# Patient Record
Sex: MALE | Race: WHITE | NOT HISPANIC OR LATINO | URBAN - METROPOLITAN AREA
[De-identification: names, ages, dates, MRNs, and addresses within clinical notes are randomized per-mention and may not be internally consistent; named-entity substitution may affect disease eponyms.]

---

## 2018-04-05 ENCOUNTER — TELEPHONE (OUTPATIENT)
Dept: OTOLARYNGOLOGY | Facility: CLINIC | Age: 63
End: 2018-04-05

## 2018-04-06 ENCOUNTER — TELEPHONE (OUTPATIENT)
Dept: OTOLARYNGOLOGY | Facility: CLINIC | Age: 63
End: 2018-04-06

## 2018-04-06 NOTE — TELEPHONE ENCOUNTER
----- Message from Kane Thompson sent at 4/6/2018  9:12 AM CDT -----  Contact: [phone]  Pt returning missed staff call, please contact at [phone]

## 2018-04-13 ENCOUNTER — OFFICE VISIT (OUTPATIENT)
Dept: OTOLARYNGOLOGY | Facility: CLINIC | Age: 63
End: 2018-04-13
Payer: MEDICARE

## 2018-04-13 VITALS
WEIGHT: 165.38 LBS | HEART RATE: 83 BPM | DIASTOLIC BLOOD PRESSURE: 60 MMHG | SYSTOLIC BLOOD PRESSURE: 116 MMHG | TEMPERATURE: 98 F

## 2018-04-13 DIAGNOSIS — C06.9 MALIGNANT NEOPLASM OF ORAL CAVITY: Primary | ICD-10-CM

## 2018-04-13 PROCEDURE — 31575 DIAGNOSTIC LARYNGOSCOPY: CPT | Mod: PBBFAC | Performed by: OTOLARYNGOLOGY

## 2018-04-13 PROCEDURE — 99212 OFFICE O/P EST SF 10 MIN: CPT | Mod: PBBFAC,25 | Performed by: OTOLARYNGOLOGY

## 2018-04-13 PROCEDURE — 99999 PR PBB SHADOW E&M-EST. PATIENT-LVL II: CPT | Mod: PBBFAC,,, | Performed by: OTOLARYNGOLOGY

## 2018-04-13 PROCEDURE — 31575 DIAGNOSTIC LARYNGOSCOPY: CPT | Mod: S$GLB,,, | Performed by: OTOLARYNGOLOGY

## 2018-04-13 PROCEDURE — 99204 OFFICE O/P NEW MOD 45 MIN: CPT | Mod: 25,S$GLB,, | Performed by: OTOLARYNGOLOGY

## 2018-04-13 RX ORDER — LATANOPROST 50 UG/ML
SOLUTION/ DROPS OPHTHALMIC
COMMUNITY
Start: 2018-04-06

## 2018-04-13 NOTE — LETTER
April 13, 2018      Toribio Navas, DDS  [address] [place] [address]           [facility]  [address]  [facility] [address]  Phone: [phone]  Fax: [phone]          Patient: Daniel Mahmood   MR Number: [id number]   YOB: 1955   Date of Visit: 4/13/2018       Dear Dr. Toribio Navas:    Thank you for referring Daniel Mahmood to me for evaluation. Attached you will find relevant portions of my assessment and plan of care.    If you have questions, please do not hesitate to call me. I look forward to following Daniel Mahmood along with you.    Sincerely,    Oleg Monson MD    Enclosure  CC:  No Recipients    If you would like to receive this communication electronically, please contact [email or web address] or [phone] to request more information on [employer] access.    For providers and/or their staff who would like to refer a patient to [facility], please contact us through our one-stop-shop provider referral line, [facility], at [phone].    If you feel you have received this communication in error or would no longer like to receive these types of communications, please e-mail [email or web address]

## 2018-04-13 NOTE — PROGRESS NOTES
Head and Neck Surgery Consult    Seen in consultation from Dr. Navas    HPI: Daniel Mahmood is a 63 y.o. male presenting with a L posterior FOM lesion incidentally discovered by his dentist. He underwent biopsy with Dr. Navas in [place] and pathology revealed superficially invasive SCC. He denies additional symptoms or neck mass. He has baseline dysarthria which is unchanged. He denies dental pain, numbness or dysphagia/odynophagia. This mass has not been treated with anything. He is a former smoker, off and on for 20 years, definitively quit in 2010. He drinks beer. He has HTN well-controlled on medication and DM controlled with diet.     History reviewed. No pertinent past medical history.    History reviewed. No pertinent surgical history.      Current Outpatient Prescriptions:     latanoprost 0.005 % ophthalmic solution, , Disp: , Rfl:     Review of patient's allergies indicates:  No Known Allergies    History reviewed. No pertinent family history.    Social History     Social History    Marital status: Unknown     Spouse name: N/A    Number of children: N/A    Years of education: N/A     Occupational History    Not on file.     Social History Main Topics    Smoking status: Not on file    Smokeless tobacco: Not on file    Alcohol use Not on file    Drug use: Unknown    Sexual activity: Not on file     Other Topics Concern    Not on file     Social History Narrative    No narrative on file       Review of Systems -  Constitutional: Denies having night sweats, constant fatigue, loss of appetite or recent substantial weight loss.  Eyes: Denies blurred vision or double vision.  Respiratory: Denies symptoms of shortness of breath, noisy breathing, hoarseness or chronic cough.  GI: Denies symptoms of heartburn, acid regurgitation, or the known presence of a hiatal hernia.  The remainder of a 10-point review of systems is negative    REVIEW OF RADIOLOGICAL FILMS AND RECORDS (PERSONALLY  REVIEWED):  Path report from [facility] reviewed - superficially invasive SCC, no histologic grade provided.    PHYSICAL EXAM:  Vitals - /60   Pulse 83   Temp 98.1 °F (36.7 °C)   Wt 75 kg (165 lb 5.5 oz)   Constitutional -      General Appearance: well developed, well nourished, without obvious deformities     Communication: speaks with a normal voice without hoarseness  Head & Face -     Overall: no obvious scars, lesions or masses     Parotid and submandibular glands: no masses or tenderness     Facial strength: normal and equal bilaterally  Eyes -      EOM intact  Ear, Nose, Mouth & Throat -     Ears: both left and right external auditory canals and TM's are normal, no external deformities     Nasal exam: mucosa is pink, septum is midline, visible turbinates are normal on anterior rhinoscopy     Mastication: teeth appear present, S/P some extractions of molars     Oral Cavity and oropharynx: mucosa, hard and soft palates, tongue, posterior pharyngeal wall, lips and gums are without lesions except for a 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 Tonsils appear absent  Respiratory:     Breathing unlabored  Larynx: using the mirror for indirect laryngoscopy, the epiglottic, false cords, true cords, and pyriform sinuses are without lesions and the true vocal cords move normally     Neck: appears symmetric, and on palpation is without masses or lymphadenopathy     Thyroid: no asymmetry, thyromegaly, or thyroid nodules on palpation  Cranial Nerves:      II: Pupillary reflexes normal     III, IV, VI: EOM normal     V: 1,2,3: normal sensation     VII: Normal strength in all divisions     IX, X: Normal voice, palatal elevation and sensation     XI: Shoulder strength normal       XII: Tongue mobility normal  Psychiatric:     Appropriate affect    FLEXIBLE LARYNGOSCOPY     Indications:  Oral cancer     Procedure:  With the patient sitting upright, informed consent was obtained.  Topical  anesthesia and vasoconstriction was applied to both nares.  After waiting an appropriate period of time for anesthesia/vasoconstriction to become effective, a flexible laryngoscope was passed through the L side(s) of the nose and the nose, nasopharynx, oropharynx, hypopharynx and larynx were examined.  The patient tolerated the procedure without complications.     Findings: The nasal passageways and nasopharynx appear normal without edema or erythema.  The oropharynx, base of tongue, piriform sinuses, epiglottis, and aryepiglottic folds were examined and no masses, lesions, or ulcerations were seen.  The true vocal cords move well to midline bilaterally.  The airway is widely patent.      ASSESSMENT: T1N0M0 SCC posterior oral cavity    PLAN: We discussed treatment options including wide local excision, possible skin graft, vs primary RT. Due to the location, RT would provide suboptimal control and surgical excision is standard of care, this will be done under general anesthesia and frozen section control. He will look at his schedule and call our office next week when he has chosen a surgery date.       Oleg Monson

## 2018-04-20 ENCOUNTER — TELEPHONE (OUTPATIENT)
Dept: OTOLARYNGOLOGY | Facility: CLINIC | Age: 63
End: 2018-04-20

## 2018-04-20 DIAGNOSIS — C06.9 MALIGNANT NEOPLASM OF ORAL CAVITY: Primary | ICD-10-CM

## 2018-04-20 NOTE — TELEPHONE ENCOUNTER
----- Message from Oleg Monson MD sent at 4/20/2018 11:42 AM CDT -----  Contact: patient  Any Monday or Thursday he would like (except for 4/23), I though he was going to call back when he'd picked one?    Oleg    ----- Message -----  From: Angeline Wilkes RN  Sent: 4/20/2018  10:34 AM  To: Oleg Monson MD    Do you have a date to offer?      -Shannon    ----- Message -----  From: Freda Vasquez  Sent: 4/19/2018   2:48 PM  To: [facility] Staff    Please call above patient wants to schedule surgery

## 2018-04-20 NOTE — TELEPHONE ENCOUNTER
----- Message from Kane Thompson sent at 4/20/2018  2:33 PM CDT -----  Contact: [phone]  Pt returning missed call, please contact at [phone]

## 2018-04-24 ENCOUNTER — HOSPITAL ENCOUNTER (OUTPATIENT)
Dept: RADIOLOGY | Facility: HOSPITAL | Age: 63
Discharge: HOME | End: 2018-04-24
Attending: OTOLARYNGOLOGY
Payer: MEDICARE

## 2018-04-24 DIAGNOSIS — C06.9 MALIGNANT NEOPLASM OF ORAL CAVITY: ICD-10-CM

## 2018-04-24 PROCEDURE — 70491 CT SOFT TISSUE NECK W/DYE: CPT | Mod: TC

## 2018-04-24 PROCEDURE — 70491 CT SOFT TISSUE NECK W/DYE: CPT | Mod: 26,,, | Performed by: RADIOLOGY

## 2018-04-24 PROCEDURE — 25500020 PHARM REV CODE 255: Performed by: OTOLARYNGOLOGY

## 2018-04-24 RX ADMIN — IOHEXOL 75 ML: 350 INJECTION, SOLUTION INTRAVENOUS at 04:04

## 2018-04-25 ENCOUNTER — ANESTHESIA EVENT (OUTPATIENT)
Dept: SURGERY | Facility: HOSPITAL | Age: 63
End: 2018-04-25
Payer: MEDICARE

## 2018-04-25 ENCOUNTER — TELEPHONE (OUTPATIENT)
Dept: OTOLARYNGOLOGY | Facility: CLINIC | Age: 63
End: 2018-04-25

## 2018-04-25 LAB
CREAT SERPL-MCNC: 0.5 MG/DL (ref 0.5–1.4)
SAMPLE: NORMAL

## 2018-04-25 NOTE — TELEPHONE ENCOUNTER
----- Message from Freda Vasquez sent at 4/25/2018  2:54 PM CDT -----  Contact: patient  Please call above patient wants to know if he takes prescribe medication in the morning need to speak with the nurse

## 2018-04-25 NOTE — ANESTHESIA PREPROCEDURE EVALUATION
[facility]  Anesthesia Pre-Operative Evaluation         Patient Name: Daniel Mahmood  YOB: 1955  MRN: [id number]    SUBJECTIVE:     Pre-operative evaluation for Procedure(s) (LRB):  RESECTION-TUMOR-ORAL (Left)     04/25/2018    Daniel Mahmood is a 63 y.o. male w/ a significant PMHx of L posterior oral mass who presents for the above procedure.      Patient Active Problem List   Diagnosis    Malignant neoplasm of oral cavity       Review of patient's allergies indicates:  No Known Allergies    Current Inpatient Medications:      No current facility-administered medications on file prior to encounter.      Current Outpatient Prescriptions on File Prior to Encounter   Medication Sig Dispense Refill    latanoprost 0.005 % ophthalmic solution          No past surgical history on file.    Social History     Social History    Marital status: Unknown     Spouse name: N/A    Number of children: N/A    Years of education: N/A     Occupational History    Not on file.     Social History Main Topics    Smoking status: Not on file    Smokeless tobacco: Not on file    Alcohol use Not on file    Drug use: Unknown    Sexual activity: Not on file     Other Topics Concern    Not on file     Social History Narrative    No narrative on file       OBJECTIVE:     Vital Signs Range (Last 24H):         CBC:   No results for input(s): WBC, RBC, HGB, HCT, PLT, MCV, MCH, MCHC in the last 72 hours.    CMP: No results for input(s): NA, K, CL, CO2, BUN, CREATININE, GLU, MG, PHOS, CALCIUM, ALBUMIN, PROT, ALKPHOS, ALT, AST, BILITOT in the last 72 hours.    INR:  No results for input(s): PT, INR, PROTIME, APTT in the last 72 hours.    Diagnostic Studies: No relevant studies.    EKG: No recent studies available.    2D ECHO:  No results found for this or any previous visit.      ASSESSMENT/PLAN:         Anesthesia Evaluation    I have reviewed the Patient Summary Reports.    I have reviewed the  Nursing Notes.   I have reviewed the Medications.     Review of Systems  Anesthesia Hx:  No problems with previous Anesthesia  History of prior surgery of interest to airway management or planning: Denies Family Hx of Anesthesia complications.   Denies Personal Hx of Anesthesia complications.   Hematology/Oncology:  Hematology Normal      Current/Recent Cancer.   EENT/Dental:  EENT/Dental Normal Posterior oral cavity mass   Cardiovascular:   Exercise tolerance: good Hypertension, well controlled Past MI CABG/stent  Angina, with exertion Stents x2, last three years ago. Remains on ASA, good functional capacity with stable angina.   Pulmonary:  Pulmonary Normal    Renal/:  Renal/ Normal     Hepatic/GI:  Hepatic/GI Normal    Musculoskeletal:  Musculoskeletal Normal    Neurological:  Neurology Normal    Endocrine:  Endocrine Normal    Dermatological:  Skin Normal    Psych:  Psychiatric Normal           Physical Exam  General:  Well nourished    Airway/Jaw/Neck:  Airway Findings: Mouth Opening: Normal Tongue: Normal  General Airway Assessment: Adult  Mallampati: II  TM Distance: Normal, at least 6 cm        Eyes/Ears/Nose:  EYES/EARS/NOSE FINDINGS: Normal   Dental:  DENTAL FINDINGS: Normal   Chest/Lungs:  Chest/Lungs Clear    Heart/Vascular:  Heart Findings: Normal Heart murmur: negative Vascular Findings: Normal    Abdomen:  Abdomen Findings: Normal    Musculoskeletal:  Musculoskeletal Findings: Normal   Skin:  Skin Findings: Normal    Mental Status:  Mental Status Findings: Normal        Anesthesia Plan  Type of Anesthesia, risks & benefits discussed:  Anesthesia Type:  general  Patient's Preference:   Intra-op Monitoring Plan: standard ASA monitors  Intra-op Monitoring Plan Comments:   Post Op Pain Control Plan: multimodal analgesia, IV/PO Opioids PRN and per primary service following discharge from PACU  Post Op Pain Control Plan Comments:   Induction:   IV  Beta Blocker:  Patient is on a Beta-Blocker and has  received one dose within the past 24 hours (No further documentation required).       Informed Consent: Patient understands risks and agrees with Anesthesia plan.  Questions answered. Anesthesia consent signed with patient.  ASA Score: 3     Day of Surgery Review of History & Physical:    H&P update referred to the surgeon.         Ready For Surgery From Anesthesia Perspective.

## 2018-04-26 ENCOUNTER — HOSPITAL ENCOUNTER (OUTPATIENT)
Facility: HOSPITAL | Age: 63
Discharge: HOME | End: 2018-04-26
Attending: OTOLARYNGOLOGY | Admitting: OTOLARYNGOLOGY
Payer: MEDICARE

## 2018-04-26 ENCOUNTER — ANESTHESIA (OUTPATIENT)
Dept: SURGERY | Facility: HOSPITAL | Age: 63
End: 2018-04-26
Payer: MEDICARE

## 2018-04-26 VITALS
RESPIRATION RATE: 16 BRPM | BODY MASS INDEX: 22.35 KG/M2 | HEART RATE: 68 BPM | TEMPERATURE: 98 F | HEIGHT: 72 IN | SYSTOLIC BLOOD PRESSURE: 159 MMHG | OXYGEN SATURATION: 98 % | WEIGHT: 165 LBS | DIASTOLIC BLOOD PRESSURE: 72 MMHG

## 2018-04-26 DIAGNOSIS — K13.21 LEUKOPLAKIA OF ORAL CAVITY: ICD-10-CM

## 2018-04-26 DIAGNOSIS — C06.9 MALIGNANT NEOPLASM OF ORAL CAVITY: Primary | ICD-10-CM

## 2018-04-26 DIAGNOSIS — I25.10 CAD (CORONARY ARTERY DISEASE): ICD-10-CM

## 2018-04-26 LAB
POCT GLUCOSE: 151 MG/DL (ref 70–110)
POCT GLUCOSE: 155 MG/DL (ref 70–110)
POCT GLUCOSE: 214 MG/DL (ref 70–110)

## 2018-04-26 PROCEDURE — 88305 TISSUE EXAM BY PATHOLOGIST: CPT | Mod: 26,,, | Performed by: PATHOLOGY

## 2018-04-26 PROCEDURE — 25000003 PHARM REV CODE 250: Performed by: NURSE ANESTHETIST, CERTIFIED REGISTERED

## 2018-04-26 PROCEDURE — 36000706: Performed by: OTOLARYNGOLOGY

## 2018-04-26 PROCEDURE — 93010 ELECTROCARDIOGRAM REPORT: CPT | Mod: ,,, | Performed by: INTERNAL MEDICINE

## 2018-04-26 PROCEDURE — 71000033 HC RECOVERY, INTIAL HOUR: Performed by: OTOLARYNGOLOGY

## 2018-04-26 PROCEDURE — 36000707: Performed by: OTOLARYNGOLOGY

## 2018-04-26 PROCEDURE — 25000003 PHARM REV CODE 250: Performed by: ANESTHESIOLOGY

## 2018-04-26 PROCEDURE — 25000003 PHARM REV CODE 250: Performed by: OTOLARYNGOLOGY

## 2018-04-26 PROCEDURE — 71000039 HC RECOVERY, EACH ADD'L HOUR: Performed by: OTOLARYNGOLOGY

## 2018-04-26 PROCEDURE — 37000008 HC ANESTHESIA 1ST 15 MINUTES: Performed by: OTOLARYNGOLOGY

## 2018-04-26 PROCEDURE — 88331 PATH CONSLTJ SURG 1 BLK 1SPC: CPT | Performed by: PATHOLOGY

## 2018-04-26 PROCEDURE — 88331 PATH CONSLTJ SURG 1 BLK 1SPC: CPT | Mod: 26,,, | Performed by: PATHOLOGY

## 2018-04-26 PROCEDURE — 82962 GLUCOSE BLOOD TEST: CPT | Mod: 91 | Performed by: OTOLARYNGOLOGY

## 2018-04-26 PROCEDURE — D9220A PRA ANESTHESIA: Mod: ANES,,, | Performed by: ANESTHESIOLOGY

## 2018-04-26 PROCEDURE — 37000009 HC ANESTHESIA EA ADD 15 MINS: Performed by: OTOLARYNGOLOGY

## 2018-04-26 PROCEDURE — 82962 GLUCOSE BLOOD TEST: CPT | Performed by: OTOLARYNGOLOGY

## 2018-04-26 PROCEDURE — 41116 EXCISION OF MOUTH LESION: CPT | Mod: 51,,, | Performed by: OTOLARYNGOLOGY

## 2018-04-26 PROCEDURE — D9220A PRA ANESTHESIA: Mod: CRNA,,, | Performed by: NURSE ANESTHETIST, CERTIFIED REGISTERED

## 2018-04-26 PROCEDURE — 63600175 PHARM REV CODE 636 W HCPCS: Performed by: NURSE ANESTHETIST, CERTIFIED REGISTERED

## 2018-04-26 PROCEDURE — 71000015 HC POSTOP RECOV 1ST HR: Performed by: OTOLARYNGOLOGY

## 2018-04-26 PROCEDURE — 41120 PARTIAL REMOVAL OF TONGUE: CPT | Mod: ,,, | Performed by: OTOLARYNGOLOGY

## 2018-04-26 PROCEDURE — 88305 TISSUE EXAM BY PATHOLOGIST: CPT | Performed by: PATHOLOGY

## 2018-04-26 PROCEDURE — 63600175 PHARM REV CODE 636 W HCPCS: Performed by: ANESTHESIOLOGY

## 2018-04-26 RX ORDER — HYDROCODONE BITARTRATE AND ACETAMINOPHEN 7.5; 325 MG/15ML; MG/15ML
15 SOLUTION ORAL EVERY 6 HOURS PRN
Status: DISCONTINUED | OUTPATIENT
Start: 2018-04-26 | End: 2018-04-26 | Stop reason: HOSPADM

## 2018-04-26 RX ORDER — GLYCOPYRROLATE 0.2 MG/ML
INJECTION INTRAMUSCULAR; INTRAVENOUS
Status: DISCONTINUED | OUTPATIENT
Start: 2018-04-26 | End: 2018-04-26

## 2018-04-26 RX ORDER — METOPROLOL TARTRATE 25 MG/1
25 TABLET, FILM COATED ORAL 2 TIMES DAILY
Status: ON HOLD | COMMUNITY
End: 2018-04-26 | Stop reason: CLARIF

## 2018-04-26 RX ORDER — GLIPIZIDE 5 MG/1
5 TABLET, FILM COATED, EXTENDED RELEASE ORAL
COMMUNITY

## 2018-04-26 RX ORDER — FENTANYL CITRATE 50 UG/ML
25 INJECTION, SOLUTION INTRAMUSCULAR; INTRAVENOUS EVERY 5 MIN PRN
Status: COMPLETED | OUTPATIENT
Start: 2018-04-26 | End: 2018-04-26

## 2018-04-26 RX ORDER — NEOSTIGMINE METHYLSULFATE 1 MG/ML
INJECTION, SOLUTION INTRAVENOUS
Status: DISCONTINUED | OUTPATIENT
Start: 2018-04-26 | End: 2018-04-26

## 2018-04-26 RX ORDER — ONDANSETRON 2 MG/ML
INJECTION INTRAMUSCULAR; INTRAVENOUS
Status: DISCONTINUED | OUTPATIENT
Start: 2018-04-26 | End: 2018-04-26

## 2018-04-26 RX ORDER — SODIUM CHLORIDE 0.9 % (FLUSH) 0.9 %
3 SYRINGE (ML) INJECTION
Status: DISCONTINUED | OUTPATIENT
Start: 2018-04-26 | End: 2018-04-26 | Stop reason: HOSPADM

## 2018-04-26 RX ORDER — CHLORHEXIDINE GLUCONATE ORAL RINSE 1.2 MG/ML
15 SOLUTION DENTAL
Qty: 473 ML | Refills: 0 | Status: SHIPPED | OUTPATIENT
Start: 2018-04-26 | End: 2018-05-12

## 2018-04-26 RX ORDER — PROPOFOL 10 MG/ML
VIAL (ML) INTRAVENOUS
Status: DISCONTINUED | OUTPATIENT
Start: 2018-04-26 | End: 2018-04-26

## 2018-04-26 RX ORDER — LABETALOL HYDROCHLORIDE 5 MG/ML
10 INJECTION, SOLUTION INTRAVENOUS ONCE
Status: COMPLETED | OUTPATIENT
Start: 2018-04-26 | End: 2018-04-26

## 2018-04-26 RX ORDER — DEXAMETHASONE SODIUM PHOSPHATE 4 MG/ML
INJECTION, SOLUTION INTRA-ARTICULAR; INTRALESIONAL; INTRAMUSCULAR; INTRAVENOUS; SOFT TISSUE
Status: DISCONTINUED | OUTPATIENT
Start: 2018-04-26 | End: 2018-04-26

## 2018-04-26 RX ORDER — MIDAZOLAM HYDROCHLORIDE 1 MG/ML
INJECTION, SOLUTION INTRAMUSCULAR; INTRAVENOUS
Status: DISCONTINUED | OUTPATIENT
Start: 2018-04-26 | End: 2018-04-26

## 2018-04-26 RX ORDER — ASPIRIN 325 MG
325 TABLET ORAL DAILY
Status: ON HOLD | COMMUNITY
End: 2018-04-26 | Stop reason: HOSPADM

## 2018-04-26 RX ORDER — METOPROLOL SUCCINATE 25 MG/1
25 TABLET, EXTENDED RELEASE ORAL DAILY
COMMUNITY

## 2018-04-26 RX ORDER — ROCURONIUM BROMIDE 10 MG/ML
INJECTION, SOLUTION INTRAVENOUS
Status: DISCONTINUED | OUTPATIENT
Start: 2018-04-26 | End: 2018-04-26

## 2018-04-26 RX ORDER — METFORMIN HYDROCHLORIDE 1000 MG/1
1000 TABLET ORAL 2 TIMES DAILY WITH MEALS
COMMUNITY

## 2018-04-26 RX ORDER — SODIUM CHLORIDE 9 MG/ML
INJECTION, SOLUTION INTRAVENOUS CONTINUOUS PRN
Status: DISCONTINUED | OUTPATIENT
Start: 2018-04-26 | End: 2018-04-26

## 2018-04-26 RX ORDER — ISOSORBIDE MONONITRATE 30 MG/1
30 TABLET, EXTENDED RELEASE ORAL DAILY
COMMUNITY

## 2018-04-26 RX ORDER — FENTANYL CITRATE 50 UG/ML
INJECTION, SOLUTION INTRAMUSCULAR; INTRAVENOUS
Status: DISCONTINUED | OUTPATIENT
Start: 2018-04-26 | End: 2018-04-26

## 2018-04-26 RX ORDER — LIDOCAINE HCL/PF 100 MG/5ML
SYRINGE (ML) INTRAVENOUS
Status: DISCONTINUED | OUTPATIENT
Start: 2018-04-26 | End: 2018-04-26

## 2018-04-26 RX ORDER — LISINOPRIL 5 MG/1
5 TABLET ORAL DAILY
COMMUNITY

## 2018-04-26 RX ORDER — ONDANSETRON 8 MG/1
8 TABLET, ORALLY DISINTEGRATING ORAL EVERY 12 HOURS PRN
Qty: 12 TABLET | Refills: 0 | Status: SHIPPED | OUTPATIENT
Start: 2018-04-26

## 2018-04-26 RX ORDER — HYDROCODONE BITARTRATE AND ACETAMINOPHEN 7.5; 325 MG/15ML; MG/15ML
15 SOLUTION ORAL EVERY 6 HOURS PRN
Qty: 473 ML | Refills: 0 | Status: SHIPPED | OUTPATIENT
Start: 2018-04-26

## 2018-04-26 RX ADMIN — SODIUM CHLORIDE: 0.9 INJECTION, SOLUTION INTRAVENOUS at 03:04

## 2018-04-26 RX ADMIN — LIDOCAINE HYDROCHLORIDE 100 MG: 20 INJECTION, SOLUTION INTRAVENOUS at 05:04

## 2018-04-26 RX ADMIN — LABETALOL HYDROCHLORIDE 10 MG: 5 INJECTION, SOLUTION INTRAVENOUS at 08:04

## 2018-04-26 RX ADMIN — HYDROCODONE BITARTRATE AND ACETAMINOPHEN 15 ML: 7.5; 325 SOLUTION ORAL at 07:04

## 2018-04-26 RX ADMIN — FENTANYL CITRATE 25 MCG: 50 INJECTION INTRAMUSCULAR; INTRAVENOUS at 07:04

## 2018-04-26 RX ADMIN — MIDAZOLAM HYDROCHLORIDE 2 MG: 1 INJECTION, SOLUTION INTRAMUSCULAR; INTRAVENOUS at 05:04

## 2018-04-26 RX ADMIN — FENTANYL CITRATE 50 MCG: 50 INJECTION, SOLUTION INTRAMUSCULAR; INTRAVENOUS at 06:04

## 2018-04-26 RX ADMIN — ROCURONIUM BROMIDE 40 MG: 10 INJECTION, SOLUTION INTRAVENOUS at 05:04

## 2018-04-26 RX ADMIN — SODIUM CHLORIDE, SODIUM GLUCONATE, SODIUM ACETATE, POTASSIUM CHLORIDE, MAGNESIUM CHLORIDE, SODIUM PHOSPHATE, DIBASIC, AND POTASSIUM PHOSPHATE: .53; .5; .37; .037; .03; .012; .00082 INJECTION, SOLUTION INTRAVENOUS at 05:04

## 2018-04-26 RX ADMIN — FENTANYL CITRATE 25 MCG: 50 INJECTION INTRAMUSCULAR; INTRAVENOUS at 08:04

## 2018-04-26 RX ADMIN — NEOSTIGMINE METHYLSULFATE 4 MG: 1 INJECTION INTRAVENOUS at 06:04

## 2018-04-26 RX ADMIN — ONDANSETRON 4 MG: 2 INJECTION INTRAMUSCULAR; INTRAVENOUS at 06:04

## 2018-04-26 RX ADMIN — GLYCOPYRROLATE 0.2 MG: 0.2 INJECTION, SOLUTION INTRAMUSCULAR; INTRAVENOUS at 05:04

## 2018-04-26 RX ADMIN — GLYCOPYRROLATE 0.4 MG: 0.2 INJECTION, SOLUTION INTRAMUSCULAR; INTRAVENOUS at 06:04

## 2018-04-26 RX ADMIN — PROPOFOL 160 MG: 10 INJECTION, EMULSION INTRAVENOUS at 05:04

## 2018-04-26 RX ADMIN — DEXAMETHASONE SODIUM PHOSPHATE 12 MG: 4 INJECTION, SOLUTION INTRAMUSCULAR; INTRAVENOUS at 05:04

## 2018-04-26 RX ADMIN — FENTANYL CITRATE 100 MCG: 50 INJECTION, SOLUTION INTRAMUSCULAR; INTRAVENOUS at 05:04

## 2018-04-26 RX ADMIN — FENTANYL CITRATE 50 MCG: 50 INJECTION, SOLUTION INTRAMUSCULAR; INTRAVENOUS at 05:04

## 2018-04-26 NOTE — H&P (VIEW-ONLY)
Head and Neck Surgery Consult    Seen in consultation from Dr. Navas    HPI: Daniel Mahmood is a 63 y.o. male presenting with a L posterior FOM lesion incidentally discovered by his dentist. He underwent biopsy with Dr. Navas in [place] and pathology revealed superficially invasive SCC. He denies additional symptoms or neck mass. He has baseline dysarthria which is unchanged. He denies dental pain, numbness or dysphagia/odynophagia. This mass has not been treated with anything. He is a former smoker, off and on for 20 years, definitively quit in 2010. He drinks beer. He has HTN well-controlled on medication and DM controlled with diet.     History reviewed. No pertinent past medical history.    History reviewed. No pertinent surgical history.      Current Outpatient Prescriptions:     latanoprost 0.005 % ophthalmic solution, , Disp: , Rfl:     Review of patient's allergies indicates:  No Known Allergies    History reviewed. No pertinent family history.    Social History     Social History    Marital status: Unknown     Spouse name: N/A    Number of children: N/A    Years of education: N/A     Occupational History    Not on file.     Social History Main Topics    Smoking status: Not on file    Smokeless tobacco: Not on file    Alcohol use Not on file    Drug use: Unknown    Sexual activity: Not on file     Other Topics Concern    Not on file     Social History Narrative    No narrative on file       Review of Systems -  Constitutional: Denies having night sweats, constant fatigue, loss of appetite or recent substantial weight loss.  Eyes: Denies blurred vision or double vision.  Respiratory: Denies symptoms of shortness of breath, noisy breathing, hoarseness or chronic cough.  GI: Denies symptoms of heartburn, acid regurgitation, or the known presence of a hiatal hernia.  The remainder of a 10-point review of systems is negative    REVIEW OF RADIOLOGICAL FILMS AND RECORDS (PERSONALLY  REVIEWED):  [facility] - superficially invasive SCC, no histologic grade provided.    PHYSICAL EXAM:  Vitals - /60   Pulse 83   Temp 98.1 °F (36.7 °C)   Wt 75 kg (165 lb 5.5 oz)   Constitutional -      General Appearance: well developed, well nourished, without obvious deformities     Communication: speaks with a normal voice without hoarseness  Head & Face -     Overall: no obvious scars, lesions or masses     Parotid and submandibular glands: no masses or tenderness     Facial strength: normal and equal bilaterally  Eyes -      EOM intact  Ear, Nose, Mouth & Throat -     Ears: both left and right external auditory canals and TM's are normal, no external deformities     Nasal exam: mucosa is pink, septum is midline, visible turbinates are normal on anterior rhinoscopy     Mastication: teeth appear present, S/P some extractions of molars     Oral Cavity and oropharynx: mucosa, hard and soft palates, tongue, posterior pharyngeal wall, lips and gums are without lesions except for a 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 Tonsils appear absent  Respiratory:     Breathing unlabored  Larynx: using the mirror for indirect laryngoscopy, the epiglottic, false cords, true cords, and pyriform sinuses are without lesions and the true vocal cords move normally     Neck: appears symmetric, and on palpation is without masses or lymphadenopathy     Thyroid: no asymmetry, thyromegaly, or thyroid nodules on palpation  Cranial Nerves:      II: Pupillary reflexes normal     III, IV, VI: EOM normal     V: 1,2,3: normal sensation     VII: Normal strength in all divisions     IX, X: Normal voice, palatal elevation and sensation     XI: Shoulder strength normal       XII: Tongue mobility normal  Psychiatric:     Appropriate affect    FLEXIBLE LARYNGOSCOPY     Indications:  Oral cancer     Procedure:  With the patient sitting upright, informed consent was obtained.  Topical  anesthesia and vasoconstriction was applied to both nares.  After waiting an appropriate period of time for anesthesia/vasoconstriction to become effective, a flexible laryngoscope was passed through the L side(s) of the nose and the nose, nasopharynx, oropharynx, hypopharynx and larynx were examined.  The patient tolerated the procedure without complications.     Findings: The nasal passageways and nasopharynx appear normal without edema or erythema.  The oropharynx, base of tongue, piriform sinuses, epiglottis, and aryepiglottic folds were examined and no masses, lesions, or ulcerations were seen.  The true vocal cords move well to midline bilaterally.  The airway is widely patent.      ASSESSMENT: T1N0M0 SCC posterior oral cavity    PLAN: We discussed treatment options including wide local excision, possible skin graft, vs primary RT. Due to the location, RT would provide suboptimal control and surgical excision is standard of care, this will be done under general anesthesia and frozen section control. He will look at his schedule and call our office next week when he has chosen a surgery date.       Oleg Monson

## 2018-04-26 NOTE — INTERVAL H&P NOTE
The patient has been examined and the H&P has been reviewed:    I concur with the findings and no changes have occurred since H&P was written.    Anesthesia/Surgery risks, benefits and alternative options discussed and understood by patient/family.          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

## 2018-04-27 NOTE — BRIEF OP NOTE
[facility]  Surgery Department  Operative Note    SUMMARY     Date of Procedure: 4/26/2018     Procedure: Procedure(s) (LRB):  RESECTION-TUMOR-ORAL (Left)     Surgeon(s) and Role:     * Oleg Monson MD - Primary     * Teddy Ortiz MD - Resident - Assisting        Pre-Operative Diagnosis: Malignant neoplasm of oral cavity [C06.9]    Post-Operative Diagnosis: Post-Op Diagnosis Codes:     * Malignant neoplasm of oral cavity [C06.9]    Anesthesia: General    Technical Procedures Used: see full operative note             Complications: No    Estimated Blood Loss (EBL): 10cc            Implants: * No implants in log *    Specimens:   Specimen (12h ago through future)    Start     Ordered    04/26/18 1839  Specimen to Pathology - Surgery  Once     Comments:  1. Left posterior floor of mouth and tongue. Short suture anterior, long suture lateral. Please assess margin - frozen - Sent to Path      04/26/18 2312                  Condition: Good    Disposition: PACU - hemodynamically stable.    Attestation: I was present and scrubbed for the entire procedure.   [facility]  Short Stay  Discharge Summary    Admit Date: 4/26/2018    Discharge Date and Time:  04/26/2018 7:02 PM      Discharge Attending Physician: Oleg Monson MD     Hospital Course Following completion of an electively scheduled procedure, the patient was transferred to the PACU for postoperative monitoring. The post operative course was uneventful and noted for adequate pain control and PO intake following surgery. The patient is discharged home in good condition and will follow-up with Dr. 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       Discharged  Condition: good    Disposition: Home or Self Care    Follow up/Patient Instructions:    Medications:  Reconciled Home Medications:      Medication List      START taking these medications    chlorhexidine 0.12 % solution  Commonly known as:  PERIDEX  Use as directed 15 mLs in the mouth or throat 2 times daily 2 hours after meal.     hydrocodone-apap 7.5-325 MG/15 ML oral solution  Commonly known as:  HYCET  Take 15 mLs by mouth every 6 (six) hours as needed for Pain.     ondansetron 8 MG Tbdl  Commonly known as:  ZOFRAN-ODT  Take 1 tablet (8 mg total) by mouth every 12 (twelve) hours as needed.        CONTINUE taking these medications    glipiZIDE 5 MG Tr24  Commonly known as:  GLUCOTROL  Take 5 mg by mouth daily with breakfast.     isosorbide mononitrate 30 MG 24 hr tablet  Commonly known as:  IMDUR  Take 30 mg by mouth once daily.     latanoprost 0.005 % ophthalmic solution     lisinopril 5 MG tablet  Commonly known as:  PRINIVIL,ZESTRIL  Take 5 mg by mouth once daily.     metFORMIN 1000 MG tablet  Commonly known as:  GLUCOPHAGE  Take 1,000 mg by mouth 2 (two) times daily with meals.     metoprolol succinate 25 MG 24 hr tablet  Commonly known as:  TOPROL-XL  Take 25 mg by mouth once daily.     multivitamin capsule  Take 1 capsule by mouth once daily.        STOP taking these medications    aspirin 325 MG tablet            Discharge Procedure Orders  Diet diabetic   Order Comments: Mechanical soft diet x 2 weeks     Activity as tolerated     No dressing needed       Follow-up Information     Oleg Monson MD. Schedule an appointment as soon as possible for a visit in 1 week.    Specialty:  Otolaryngology  Why:  For wound re-check  Contact information:  [address]  [facility] [address]  [phone]

## 2018-04-27 NOTE — TRANSFER OF CARE
Anesthesia Transfer of Care Note    Patient: Daniel Mahmood    Procedure(s) Performed: Procedure(s) (LRB):  RESECTION-TUMOR-ORAL (Left)    Patient location: PACU    Anesthesia Type: general    Transport from OR: Transported from OR on 6-10 L/min O2 by face mask with adequate spontaneous ventilation    Post pain: adequate analgesia    Post assessment: no apparent anesthetic complications and tolerated procedure well    Post vital signs: stable    Level of consciousness: awake, alert and oriented    Nausea/Vomiting: no nausea/vomiting    Complications: none    Transfer of care protocol was followed      Last vitals:   Visit Vitals  BP (!) 168/78 (BP Location: Right arm, Patient Position: Lying)   Pulse 78   Temp 36.2 °C (97.1 °F) (Axillary)   Resp 17   Ht 6' (1.829 m)   Wt 74.8 kg (165 lb)   SpO2 100%   BMI 22.38 kg/m²

## 2018-04-27 NOTE — OP NOTE
DATE OF PROCEDURE:  04/26/2018    SURGEON:  Oleg Monson M.D.    ASSISTANT SURGEON:  Teddy Ortiz M.D. (RES)    ANESTHESIA:  General endotracheal anesthesia.    PROCEDURES PERFORMED:  1.  Wide local excision of malignant neoplasm of left posterior floor of mouth.  2.  Left partial glossectomy, less than one-half of the tongue.    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  He presents now for definitive resection with margins.    POSTOPERATIVE DIAGNOSES:  This is a gentleman with a T1 N0 M0 squamous cell   carcinoma of the left glossopharyngeal sulcus, which was biopsied by a dentist   at an outside institution and obtained the above diagnosis.  He presents now for   definitive resection with margins.    PROCEDURE IN DETAIL:  After obtaining informed consent, the patient was taken to   the Operating Room in the supine position.  General endotracheal anesthesia was   induced without difficulty.  The ear was prepped and draped in a standard   sterile fashion.  Then, 1 cm margins were drawn around the prior biopsy site,   which did encompass both the posterior floor of the mouth as well as the   posterior one-third of the tongue on its lateral surface.  The Bovie   electrocautery was used to incise the mucosa and this was carried down to a   plane deep to the minor salivary gland tissue and into a cuff of muscle on the   tongue portion.  The partial glossectomy was first performed, carrying the   resection in this plane underneath the elliptiform shaped mucosa.  This was then   connected to the sub-salivary gland plane on the posterior floor of the mouth,   taking care not to injure the lingual nerve.  The specimen was oriented and sent   for frozen section where the margins were negative for carcinoma.  The  wound   was then closed with horizontal mattress 3-0 Vicryl sutures.  This concluded the   procedure.  The patient was rotated back to Anesthesia, awakened in the   Operating Room without difficulty.  There were no complications and estimated   blood loss was zero.      MARSHALL/IVELISSE  dd: 04/26/2018 18:40:24 (CDT)  td: 04/26/2018 21:21:54 (CDT)  Doc ID   #[id number]  Job ID #[id number]    CC:

## 2018-04-27 NOTE — DISCHARGE INSTRUCTIONS
Transoral Resection for Oral Cancer    Transoral resection is surgery to help control or cure oral cancer. During surgery, an oral tumor is removed through the mouth. The tumor could be on the tongue, under the tongue, inside the cheek, or elsewhere inside the mouth that's fairly easy to reach. Some healthy tissue around the tumor is also removed, to help make sure that no cancer cells remain. This sheet explains transoral resection and what to expect. After surgery, more cancer treatment may be needed, such as chemotherapy and radiation. Your healthcare provider will discuss your treatment plan with you.  Preparing for surgery  Prepare for the procedure as you have been instructed. Be sure to tell your healthcare provider about all medicines you take. This includes over-the-counter medicines. It also includes herbs and other supplements. You may need to stop taking some or all of them before surgery. Also, follow any directions youre given for not eating or drinking before surgery.  Before the surgery  The surgery takes 1 to 3 hours. Before the surgery begins:  · An IV line is put into a vein in your arm or hand. This line delivers fluids and medicines.  · You will be given medicine (anesthesia) to keep you pain free during the surgery. You will have general anesthesia. This puts you into a state like deep sleep during the surgery so you don't feel pain.  During the surgery  Here is what to expect during surgery:  · An endoscope is inserted into the mouth. This is a flexible tube with a lighted camera.  · Incisions (cuts) are made inside the mouth and the tumor is removed.  · Some healthy tissue around the tumor is also removed. This is sent to a lab, where its checked for cancer cells.  · The cut pieces of tissue are joined together with sutures (stitches). These dissolve on their own over time. In some cases, a small graft of skin may be used to help fill in the area that was removed. The graft is stitched in  place with sutures.  · Depending on the tumors location, you may need a tracheostomy (trach) tube to help you breathe, or a feeding tube to help you eat. If one or both of these are needed, they will be inserted at the end of the surgery. Your healthcare provider will tell you more.  After the surgery  You will be taken to a recovery room to wake up from the anesthesia. You may feel sleepy and nauseated. You will receive pain medicine. Let your healthcare providers know if your pain is not controlled. When you are ready, you will be taken to your hospital room to stay for one or more nights. When its time for you to be released from the hospital, have an adult family member or friend ready to drive you. Have someone stay with you for a few days to help care for you as your healing begins. If youre going home with a trach tube or feeding tube, you will be shown how to care for these before youre discharged.  Recovering at home  Once at home, follow the instructions you have been given. Be aware that the surgery may affect your ability to swallow and to speak. You may be referred to a speech therapist, who can help you with these skills starting soon after surgery. During your recovery:  · Take all antibiotics and pain medicine as directed.  · You may have diarrhea from diet changes, stress, or an infection. Talk with a dietitian about what you can eat to reduce the chances of getting diarrhea.  · You may have constipation from using opioid pain killers, from not moving much, or from not eating much. Talk with your healthcare provider or nurse about getting more fiber in your diet.  · Resume your normal diet slowly. To protect your healing mouth, avoid spicy or acidic foods such as hot peppers, oranges, and tomatoes. Also avoid sharp, hard foods such as nuts and chips. Smooth soups, puddings, milkshakes, and blended fruit drinks will be easiest on your mouth.  · Return to your normal daily routine when the  healthcare provider says its OK. This includes work and exercise.  · Avoid driving until you are no longer taking pain medicines that make you drowsy.  When to call the healthcare provider  Be sure you have a contact number for your healthcare provider. After you get home, call if you have any of the following:  · Chest pain or trouble breathing (call 911 or other emergency service)  · Fever of 100.4°F (38°C) or higher, or as directed by your healthcare provider  · Pain that gets worse or is not relieved by pain medicines  · Bleeding in the mouth  · Excessive swelling  · Trouble breathing  · Choking on food or liquids  · Severe nausea or vomiting  · Pain or swelling in the legs   Follow-up  During follow-up visits, your healthcare provider will check on your healing. You will discuss treatment with radiation or chemotherapy, if needed. Regular checkups are very important. These help ensure that the cancer has not returned. Regular dental exams may be needed. If you smoke, talk to your healthcare provider about quitting. Smoking makes oral cancer treatment less effective. And it increases your risk for another tumor.  Risks and possible complications  Risks of transoral resection include:  · Bleeding  · Blockage of the airway  · Blood clots  · Infection  · Changes in taste  · Weight loss  · Trouble swallowing or speaking after healing (if part of the tongue is removed)  · Nerve injury, causing numbness of the lip or tongue (may be temporary or permanent)  · Aspirating (breathing in) food or liquids  · Risks of anesthesia (you will discuss these with the anesthesiologist)  · Return of the cancer         Soft Diet  Your healthcare provider has prescribed a soft diet (also called gastrointestinal soft diet). This means eating foods that are soft, low in fiber, and easy to digest. This diet is for people with digestive problems. A soft diet provides foods that are easy to chew and swallow. Foods should be bite-size and  very soft or moist. Follow your healthcare providers specific instructions about what foods and drinks you may have. The general guidelines below can help you get started on this diet.       Beverages  OK: Milk, tea, coffee, fruit juices, carbonated beverages, nutrition shakes, and drinks (Note: Thin liquids may be hard to swallow. They may need to be thickened.)  Avoid: None, unless they need to be thickened  Breads and crackers  OK: Refined white, wheat, or seedless rye bread; henry or soda crackers that have been moistened; plain rolls or bagels; very soft tortillas  Avoid: Whole-grain breads, rolls, or bagels with nuts, raisins, or seeds; crackers, croutons, taco shells  Cereals and grains  OK: Cooked cereals, plain dry cereals that have been moistened, plain macaroni, spaghetti, noodles, rice  Avoid: Whole-grain cereals and granola, or cereals containing bran, raisins, seeds or nuts; coconut; brown or wild rice  Desserts and sweets  OK: Moist cake; soft fruit pie with bottom crust only; soft cookies moistened in milk or other liquid; gelatin, custard, pudding, plain ice cream, plain sherbet, sugar, honey, clear jelly  Avoid: Pastries, desserts, and ice cream that have nuts, coconut, seeds, or dried fruit; popcorn; chips of any kind including potato chips and tortilla chips; jam, marmalade  Eggs and cheese  OK: Poached, soft boiled, or scrambled eggs; cottage cheese, ricotta cheese, cream cheese, cheese sauces, or cheese melted in other dishes  Avoid: Crisp fried eggs, cheese slices and cubes  Fruits  OK: Avocado, banana, baked peeled apple, applesauce, peeled ripe peaches or pears, canned fruit (apricots, cherries, peaches, pears), melons  Avoid: Raw apple, dried fruits, coconut, pineapple, grapes, fruit jenelle, fruit snacks  Meat and fish  OK: All fresh meat, poultry, or fish that is cooked until tender  Avoid: Meat, fish, or poultry that is fried; tough or stringy meat including brand, sausage, bratwurst,  jerky, corned beef  Other protein foods  OK: Tofu, baked beans, smooth peanut butter or other nut or seed butters  Avoid: Deep-fried tofu; crunchy peanut or other nut or seed butters; nuts or seeds that are whole or chopped  Soups  OK: All soups, but they may need to be thickened. Thin liquid may be too hard to swallow.  Avoid: Soups made with stringy meat pieces or chunky vegetables  Vegetables  OK: Peeled and well-cooked potatoes or sweet potatoes; fresh, cooked, canned, or frozen vegetables without seeds, skin, or coarse fiber  Avoid: Raw vegetables, deep-fried vegetables (such as tempura), and corn

## 2018-05-03 NOTE — ANESTHESIA RELEASE NOTE
Anesthesia Release from PACU Note    Patient: Daniel Mahmood    Procedure(s) Performed: Procedure(s) (LRB):  RESECTION-TUMOR-ORAL (Left)    Anesthesia type: general    Post pain: Adequate analgesia    Post assessment: no apparent anesthetic complications    Last Vitals:   Visit Vitals  BP (!) 159/72   Pulse 68   Temp 36.7 °C (98 °F) (Temporal)   Resp 16   Ht 6' (1.829 m)   Wt 74.8 kg (165 lb)   SpO2 98%   BMI 22.38 kg/m²       Post vital signs: stable    Level of consciousness: awake, alert  and oriented    Nausea/Vomiting: no nausea/no vomiting    Complications: none    Airway Patency: patent    Respiratory: unassisted    Cardiovascular: stable and blood pressure at baseline    Hydration: euvolemic

## 2018-05-03 NOTE — ANESTHESIA POSTPROCEDURE EVALUATION
Anesthesia Post Evaluation    Patient: Daniel Mahmood    Procedure(s) Performed: Procedure(s) (LRB):  RESECTION-TUMOR-ORAL (Left)    Final Anesthesia Type: general  Patient location during evaluation: PACU  Patient participation: Yes- Able to Participate  Level of consciousness: awake and alert  Post-procedure vital signs: reviewed and stable  Pain management: adequate  Airway patency: patent  PONV status at discharge: No PONV  Anesthetic complications: no      Cardiovascular status: blood pressure returned to baseline  Respiratory status: unassisted  Hydration status: euvolemic  Follow-up not needed.        Visit Vitals  BP (!) 159/72   Pulse 68   Temp 36.7 °C (98 °F) (Temporal)   Resp 16   Ht 6' (1.829 m)   Wt 74.8 kg (165 lb)   SpO2 98%   BMI 22.38 kg/m²       Pain/Philip Score: No Data Recorded

## 2018-05-04 ENCOUNTER — TELEPHONE (OUTPATIENT)
Dept: OTOLARYNGOLOGY | Facility: CLINIC | Age: 63
End: 2018-05-04

## 2018-05-04 NOTE — TELEPHONE ENCOUNTER
Called and spoke with patient in detail whom stated he was just calling to confirm his post op appointment which I assisted him by telling him that his appointment was Tuesday 5/8 at 930 am patient verbalized understanding

## 2018-05-04 NOTE — TELEPHONE ENCOUNTER
----- Message from Freda Vasquez sent at 5/4/2018  2:36 PM CDT -----  Contact: patient  Please call above patient returning a call to the nurse

## 2018-05-04 NOTE — TELEPHONE ENCOUNTER
----- Message from Ruth Martinez sent at 5/4/2018 10:06 AM CDT -----  Contact: patient   Patient would like for nurse to call him to verify that the appt time scheduled for his post op is an ok time. Please call patient back at [phone]

## 2018-05-08 ENCOUNTER — OFFICE VISIT (OUTPATIENT)
Dept: OTOLARYNGOLOGY | Facility: CLINIC | Age: 63
End: 2018-05-08
Payer: MEDICARE

## 2018-05-08 VITALS
HEART RATE: 119 BPM | BODY MASS INDEX: 21.43 KG/M2 | DIASTOLIC BLOOD PRESSURE: 73 MMHG | SYSTOLIC BLOOD PRESSURE: 138 MMHG | TEMPERATURE: 98 F | WEIGHT: 158 LBS

## 2018-05-08 DIAGNOSIS — C06.9 MALIGNANT NEOPLASM OF ORAL CAVITY: Primary | ICD-10-CM

## 2018-05-08 PROCEDURE — 99024 POSTOP FOLLOW-UP VISIT: CPT | Mod: S$GLB,,, | Performed by: OTOLARYNGOLOGY

## 2018-05-08 PROCEDURE — 99999 PR PBB SHADOW E&M-EST. PATIENT-LVL III: CPT | Mod: PBBFAC,,, | Performed by: OTOLARYNGOLOGY

## 2018-05-08 NOTE — PROGRESS NOTES
[facility] NOTE    CC: F/U WLE residual tumor    TREATMENT HISTORY:  1. Incisional biopsy, [place], T1N0M0 SCC L posterior FOM, 04/2018  2. WLE with frozen sections of prior biopsy site, 4/26/2018    INTERVAL HISTORY:Daniel Mahmood returns to [facility] for follow-up of T1N0M0 SCC oral cavity. No complaints today.Denies dysphagia, odynophagia, throat pain and otalgia.Voice is stable. Does not experience dry mouth. Denies fevers, chills, and nightsweats. There has not been any redness or drainage from the wound.    Exam:  Incision well-healed  Neck without mass/LAD  Normal tongue mobility    A/P: Healing well. Final pathology revealed no residual tumor in the specimen.

## 2020-07-09 ENCOUNTER — HOSPITAL ENCOUNTER (OUTPATIENT)
Dept: RADIOLOGY | Facility: HOSPITAL | Age: 65
Discharge: HOME | End: 2020-07-09
Attending: FAMILY MEDICINE
Payer: MEDICARE

## 2020-07-09 DIAGNOSIS — M79.89 SWELLING OF LEFT HAND: ICD-10-CM

## 2020-07-09 DIAGNOSIS — S61.452A DOG BITE OF LEFT HAND: ICD-10-CM
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
## 2020-07-09 PROCEDURE — 73130 XR HAND COMPLETE 3 VIEW LEFT: ICD-10-PCS | Mod: 26,LT,, | Performed by: RADIOLOGY

## 2020-07-09 PROCEDURE — 73130 X-RAY EXAM OF HAND: CPT | Mod: TC,PN,LT

## 2020-07-09 PROCEDURE — 73130 X-RAY EXAM OF HAND: CPT | Mod: 26,LT,, | Performed by: RADIOLOGY

## 2025-07-10 NOTE — PLAN OF CARE
Patient is AAOx4. VSS. NAD. IV discontinued. States pain tolerable. Denies nausea. Patient is aware to take metoprolol when he arrives home. Discharge instructions given, verbalized understanding. DIscharged via wheelchair to garage with friend.  
Warm

## (undated) DEVICE — DRAPE STERI INSTRUMENT 1018

## (undated) DEVICE — SPONGE PATTY SURGICAL .5X3IN

## (undated) DEVICE — SUCTION COAGULATOR 10FR 6IN

## (undated) DEVICE — CORD BIPOLAR 12 FOOT

## (undated) DEVICE — SHEET EENT SPLIT

## (undated) DEVICE — SUT LIGACLIP SMALL XTRA

## (undated) DEVICE — SKINMARKER & RULER REGULAR X-F

## (undated) DEVICE — GOWN SURGICAL X-LARGE

## (undated) DEVICE — SEE MEDLINE ITEM 157128

## (undated) DEVICE — TRAY MINOR GEN SURG

## (undated) DEVICE — SEE MEDLINE ITEM 152622

## (undated) DEVICE — ELECTRODE REM PLYHSV RETURN 9

## (undated) DEVICE — NDL HYPO REG 25G X 1 1/2

## (undated) DEVICE — ELECTRODE BLADE INSULATED 1 IN

## (undated) DEVICE — CLIP MED TICALL